# Patient Record
Sex: FEMALE | Race: WHITE | NOT HISPANIC OR LATINO | URBAN - METROPOLITAN AREA
[De-identification: names, ages, dates, MRNs, and addresses within clinical notes are randomized per-mention and may not be internally consistent; named-entity substitution may affect disease eponyms.]

---

## 2021-03-31 DIAGNOSIS — Z23 ENCOUNTER FOR IMMUNIZATION: ICD-10-CM

## 2021-04-12 ENCOUNTER — IMMUNIZATIONS (OUTPATIENT)
Dept: FAMILY MEDICINE CLINIC | Facility: HOSPITAL | Age: 66
End: 2021-04-12

## 2021-04-12 DIAGNOSIS — Z23 ENCOUNTER FOR IMMUNIZATION: Primary | ICD-10-CM

## 2021-04-12 PROCEDURE — 0001A SARS-COV-2 / COVID-19 MRNA VACCINE (PFIZER-BIONTECH) 30 MCG: CPT

## 2021-04-12 PROCEDURE — 91300 SARS-COV-2 / COVID-19 MRNA VACCINE (PFIZER-BIONTECH) 30 MCG: CPT

## 2021-05-05 ENCOUNTER — IMMUNIZATIONS (OUTPATIENT)
Dept: FAMILY MEDICINE CLINIC | Facility: HOSPITAL | Age: 66
End: 2021-05-05

## 2021-05-05 DIAGNOSIS — Z23 ENCOUNTER FOR IMMUNIZATION: Primary | ICD-10-CM

## 2021-05-05 PROCEDURE — 0002A SARS-COV-2 / COVID-19 MRNA VACCINE (PFIZER-BIONTECH) 30 MCG: CPT

## 2021-05-05 PROCEDURE — 91300 SARS-COV-2 / COVID-19 MRNA VACCINE (PFIZER-BIONTECH) 30 MCG: CPT

## 2022-11-30 ENCOUNTER — ANNUAL EXAM (OUTPATIENT)
Dept: OBGYN CLINIC | Facility: CLINIC | Age: 67
End: 2022-11-30

## 2022-11-30 VITALS
WEIGHT: 224 LBS | DIASTOLIC BLOOD PRESSURE: 80 MMHG | BODY MASS INDEX: 37.32 KG/M2 | SYSTOLIC BLOOD PRESSURE: 138 MMHG | HEIGHT: 65 IN

## 2022-11-30 DIAGNOSIS — Z12.31 ENCOUNTER FOR SCREENING MAMMOGRAM FOR MALIGNANT NEOPLASM OF BREAST: ICD-10-CM

## 2022-11-30 DIAGNOSIS — Z01.419 ENCNTR FOR GYN EXAM (GENERAL) (ROUTINE) W/O ABN FINDINGS: Primary | ICD-10-CM

## 2022-11-30 RX ORDER — LANSOPRAZOLE 30 MG/1
30 CAPSULE, DELAYED RELEASE ORAL DAILY
COMMUNITY
Start: 2022-11-23

## 2022-11-30 RX ORDER — ALBUTEROL SULFATE 90 UG/1
2 AEROSOL, METERED RESPIRATORY (INHALATION) EVERY 6 HOURS PRN
COMMUNITY

## 2022-11-30 RX ORDER — VITAMIN B COMPLEX
2000 TABLET ORAL
COMMUNITY
Start: 2020-11-01

## 2022-11-30 RX ORDER — METOPROLOL SUCCINATE 100 MG/1
100 TABLET, EXTENDED RELEASE ORAL DAILY
COMMUNITY
Start: 2022-11-17

## 2022-11-30 RX ORDER — MONTELUKAST SODIUM 10 MG/1
10 TABLET ORAL AS NEEDED
COMMUNITY

## 2022-11-30 NOTE — PROGRESS NOTES
Medicare [facility]  [address], [address], [place], [place]    ASSESSMENT/PLAN: Per Murcia is a 78 y o  No obstetric history on file  who presents for [facility] gynecologic wellness exam     Encounter for routine gynecologic examination  - Routine well woman exam completed today  - Cervical Cancer Screening complete, no further screening necessary   - Breast Cancer Screening: Last Mammogram 02/14/2022  - Colorectal cancer screening last 2 yrs ago- Osteoporosis screening 2 yrs ago, patient declines order for Dexa scan  - The following were reviewed in today's visit: breast self exam     Discussed with patient Medicare (and plans that act like Medicare) pay for wellness visit q 2 yrs - but patient may return for problems as needed  Recommend annual breast exam and mammogram   If not seen by our office on her off year, she can still call and ask for a screening mammogram order and the nurses will provide one for her  Additional problems addressed during this visit:  1  Encntr for gyn exam (general) (routine) w/o abn findings    2  Encounter for screening mammogram for malignant neoplasm of breast  -     Mammo screening bilateral w 3d & cad; Future        Next visit: 2 yrs/PRN    CC:  Medicare Gynecologic Wellness Examination    HPI: Per Murcia is a 78 y o  No obstetric history on file  who presents for [facility] gynecologic examination  Patient presents for Gyn exam   Denies having any concerns  Gyn History     She  reports that she is not currently sexually active and has had partner(s) who are male  She reports using the following method of birth control/protection: Post-menopausal        Past Medical History:  No date: Asthma  08/10/1984: Ectopic pregnancy  No date: Hiatal hernia  02/11/2022: History of screening mammography      Comment:  Bi-Rads 2   08/10/1984: History of transfusion  2021: Hypertension  2022: Hypertension  ? ??: Varicella     Past Surgical History:  1996: BREAST BIOPSY; N/A  No date: CONDYLOMA EXCISION/FULGURATION  1984: ECTOPIC PREGNANCY SURGERY; N/A  1983: TUBAL LIGATION; N/A     Family History   Problem Relation Age of Onset   • Thrombosis Mother    • Stroke Mother    • Hypertension Mother    • Heart attack Maternal Grandfather    • Cancer Paternal Grandmother    • Breast cancer Neg Hx    • Colon cancer Neg Hx         Social History     Tobacco Use   • Smoking status: Former     Packs/day: 0 00     Years: 40 00     Pack years: 0 00     Types: Cigarettes   • Smokeless tobacco: Never   Vaping Use   • Vaping Use: Never used   Substance Use Topics   • Alcohol use: Yes     Alcohol/week: 9 0 standard drinks     Types: 7 Glasses of wine, 2 Shots of liquor per week   • Drug use: Never          Current Outpatient Medications:   •  albuterol (PROVENTIL HFA,VENTOLIN HFA) 90 mcg/act inhaler, Inhale 2 puffs every 6 (six) hours as needed for wheezing, Disp: , Rfl:   •  cholecalciferol (VITAMIN D3) 25 mcg (1,000 units) tablet, 2,000 Units, Disp: , Rfl:   •  lansoprazole (PREVACID) 30 mg capsule, Take 30 mg by mouth daily, Disp: , Rfl:   •  metoprolol succinate (TOPROL-XL) 100 mg 24 hr tablet, Take 100 mg by mouth daily, Disp: , Rfl:   •  montelukast (SINGULAIR) 10 mg tablet, Take 10 mg by mouth if needed, Disp: , Rfl:     She is allergic to amoxicillin, bactrim [sulfamethoxazole-trimethoprim], bee venom, demerol [meperidine], and pollen extract       ROS negative except as noted in HPI    Objective:  /80   Ht 5' 4 75" (1 645 m)   Wt 102 kg (224 lb)   Breastfeeding No   BMI 37 56 kg/m²      Physical Exam  Vitals and nursing note reviewed  HENT:      Head: Normocephalic  Chest:   Breasts:     Breasts are symmetrical       Right: Normal  No bleeding, mass, nipple discharge, skin change or tenderness  Left: Normal  No bleeding, mass, nipple discharge, skin change or tenderness  Abdominal:      General: There is no distension  Palpations: Abdomen is soft  There is no mass  Tenderness: There is no abdominal tenderness  There is no rebound  Genitourinary:     General: Normal vulva  Exam position: Lithotomy position  Labia:         Right: No rash, tenderness or lesion  Left: No rash, tenderness or lesion  Urethra: No urethral pain or urethral lesion  Vagina: Normal  No vaginal discharge  Cervix: No discharge, friability, lesion or erythema  Uterus: Normal        Adnexa: Right adnexa normal and left adnexa normal       Rectum: No external hemorrhoid  Musculoskeletal:      Right lower leg: No edema  Left lower leg: No edema  Lymphadenopathy:      Upper Body:      Right upper body: No axillary or pectoral adenopathy  Left upper body: No axillary or pectoral adenopathy  Skin:     General: Skin is warm  Neurological:      Mental Status: She is alert and oriented to person, place, and time  Psychiatric:         Mood and Affect: Mood normal          Behavior: Behavior normal          Thought Content:  Thought content normal

## 2023-02-27 ENCOUNTER — TELEPHONE (OUTPATIENT)
Dept: OBGYN CLINIC | Facility: CLINIC | Age: 68
End: 2023-02-27

## 2023-02-27 NOTE — TELEPHONE ENCOUNTER
Patient l/m stating that she was in to see Dr Donna Gottron 11/30/22 and at that time patient had declined Dexa Scan  Now she changed her mind and wishes to proceed with the Dexa scan and requesting the order to be faxed to [facility]  Dr Donna Gottron please address

## 2023-03-02 NOTE — TELEPHONE ENCOUNTER
No diagnosis code in chart for clinical staff to provide order  Dr Akhil Degroot, please generate dexa scan order

## 2023-03-06 NOTE — TELEPHONE ENCOUNTER
3/6/2023 patient called wanted to know if Dexa Scan order was faxed to [facility], I informed patient that it was not faxed as yet, kindly give patient a call when order is faxed  Thank you in advance

## 2023-03-08 DIAGNOSIS — Z12.31 ENCOUNTER FOR SCREENING MAMMOGRAM FOR MALIGNANT NEOPLASM OF BREAST: ICD-10-CM

## 2023-03-08 DIAGNOSIS — Z78.0 POSTMENOPAUSAL ESTROGEN DEFICIENCY: Primary | ICD-10-CM

## 2023-03-08 DIAGNOSIS — R93.7 ABNORMAL BONE DENSITY SCREENING: ICD-10-CM

## 2023-03-09 NOTE — TELEPHONE ENCOUNTER
Order placed in chart, however unable to transmit to [address]  Please mail to patient or advise to pick it up from the office

## 2023-03-22 DIAGNOSIS — R93.7 ABNORMAL BONE DENSITY SCREENING: ICD-10-CM

## 2023-03-22 DIAGNOSIS — Z78.0 POSTMENOPAUSAL ESTROGEN DEFICIENCY: ICD-10-CM

## 2023-03-23 ENCOUNTER — TELEPHONE (OUTPATIENT)
Dept: OBGYN CLINIC | Facility: CLINIC | Age: 68
End: 2023-03-23

## 2024-05-02 ENCOUNTER — TELEPHONE (OUTPATIENT)
Age: 69
End: 2024-05-02

## 2024-05-02 DIAGNOSIS — Z12.31 ENCOUNTER FOR SCREENING MAMMOGRAM FOR MALIGNANT NEOPLASM OF BREAST: Primary | ICD-10-CM

## 2024-05-02 NOTE — TELEPHONE ENCOUNTER
Patient is requesting mammogram script to be placed evan her chart and faxed to [facility]    Fax to [phone].

## 2024-05-13 DIAGNOSIS — Z12.31 ENCOUNTER FOR SCREENING MAMMOGRAM FOR MALIGNANT NEOPLASM OF BREAST: ICD-10-CM

## 2024-12-04 ENCOUNTER — ANNUAL EXAM (OUTPATIENT)
Dept: OBGYN CLINIC | Facility: CLINIC | Age: 69
End: 2024-12-04
Payer: MEDICARE

## 2024-12-04 VITALS
SYSTOLIC BLOOD PRESSURE: 132 MMHG | BODY MASS INDEX: 35.82 KG/M2 | HEIGHT: 65 IN | WEIGHT: 215 LBS | DIASTOLIC BLOOD PRESSURE: 76 MMHG

## 2024-12-04 DIAGNOSIS — Z12.31 ENCOUNTER FOR SCREENING MAMMOGRAM FOR MALIGNANT NEOPLASM OF BREAST: ICD-10-CM

## 2024-12-04 DIAGNOSIS — Z01.419 ENCNTR FOR GYN EXAM (GENERAL) (ROUTINE) W/O ABN FINDINGS: Primary | ICD-10-CM

## 2024-12-04 PROCEDURE — G0101 CA SCREEN;PELVIC/BREAST EXAM: HCPCS | Performed by: OBSTETRICS & GYNECOLOGY

## 2024-12-04 RX ORDER — DOXYCYCLINE HYCLATE 50 MG/1
50 CAPSULE ORAL DAILY
COMMUNITY
Start: 2024-10-30

## 2024-12-04 RX ORDER — ANTIOX #8/OM3/DHA/EPA/LUT/ZEAX 250-2.5 MG
CAPSULE ORAL
COMMUNITY
Start: 2022-01-01

## 2024-12-04 RX ORDER — AMLODIPINE BESYLATE 5 MG/1
1 TABLET ORAL DAILY
COMMUNITY
Start: 2024-09-05

## 2024-12-05 NOTE — PROGRESS NOTES
Medicare Gyn Wellness  [facility] [place]  [address], [address], [place] [address]    ASSESSMENT/PLAN: Heavenly Gregorio is a 68 y.o. No obstetric history on file. who presents for Medicare gynecologic wellness exam.    Encounter for routine gynecologic examination  - Routine well woman exam completed today.  - Cervical Cancer Screening complete, no further screening necessary.  - Breast Cancer Screening: Last Mammogram 05/10/2024, order provided  - Colorectal cancer screening upto date with Cologuard.  - Osteoporosis screening Dexa scan 2023.  - The following were reviewed in today's visit: breast self exam, mammography screening ordered, menopause, osteoporosis, adequate intake of calcium and vitamin D, exercise, and healthy diet.    Assessment & Plan  Encounter for screening mammogram for malignant neoplasm of breast    Orders:    Mammo screening bilateral w 3d and cad; Future    Encntr for gyn exam (general) (routine) w/o abn findings           Discussed with patient Medicare (and plans that act like Medicare) pay for wellness visit q 2 yrs - but patient may return for problems as needed.  Recommend annual breast exam and mammogram.  If not seen by our office on her off year, she can still call and ask for a screening mammogram order and the nurses will provide one for her.    Next visit: 2 yrs [place]      CC:  Medicare Gynecologic Wellness Examination    HPI: Heavenly Gregorio is a 68 y.o. No obstetric history on file. who presents for Medicare gynecologic examination.      Gyn History       She  reports that she is not currently sexually active and has had partner(s) who are male. She reports using the following method of birth control/protection: Diaphragm.       Past Medical History:  No date: Asthma  08/10/1984: Ectopic pregnancy  No date: Hiatal hernia  02/11/2022: History of screening mammography      Comment:  Bi-Rads 2.  08/10/1984: History of transfusion  2021: Hypertension  2022:  "Hypertension  ???: Varicella     Past Surgical History:  1996: BREAST BIOPSY; N/A  No date: CONDYLOMA EXCISION/FULGURATION  1984: ECTOPIC PREGNANCY SURGERY; N/A  1983: TUBAL LIGATION; N/A     Family History   Problem Relation Age of Onset    Thrombosis Mother     Stroke Mother     Hypertension Mother     Heart attack Maternal Grandfather     Cancer Paternal Grandmother     Breast cancer Neg Hx     Colon cancer Neg Hx         Social History     Tobacco Use    Smoking status: Former     Current packs/day: 0.00     Types: Cigarettes    Smokeless tobacco: Never   Vaping Use    Vaping status: Never Used   Substance Use Topics    Alcohol use: Yes     Alcohol/week: 9.0 standard drinks of alcohol     Types: 7 Glasses of wine, 2 Shots of liquor per week    Drug use: Never          Current Outpatient Medications:     albuterol (PROVENTIL HFA,VENTOLIN HFA) 90 mcg/act inhaler, Inhale 2 puffs every 6 (six) hours as needed for wheezing, Disp: , Rfl:     amLODIPine (NORVASC) 5 mg tablet, Take 1 tablet by mouth in the morning, Disp: , Rfl:     cholecalciferol (VITAMIN D3) 25 mcg (1,000 units) tablet, 2,000 Units, Disp: , Rfl:     doxycycline hyclate (VIBRAMYCIN) 50 mg capsule, 50 mg daily, Disp: , Rfl:     lansoprazole (PREVACID) 30 mg capsule, Take 30 mg by mouth daily, Disp: , Rfl:     montelukast (SINGULAIR) 10 mg tablet, Take 10 mg by mouth if needed, Disp: , Rfl:     Multiple Vitamins-Minerals (PreserVision AREDS 2) CAPS, , Disp: , Rfl:     She is allergic to amoxicillin, bactrim [sulfamethoxazole-trimethoprim], bee venom, demerol [meperidine], and pollen extract..    ROS negative except as noted in HPI    Objective:  /76 (BP Location: Right arm, Patient Position: Sitting, Cuff Size: Large)   Ht 5' 4.75\" (1.645 m)   Wt 97.5 kg (215 lb)   BMI 36.05 kg/m²      Physical Exam  Vitals and nursing note reviewed.   HENT:      Head: Normocephalic.   Chest:   Breasts:     Breasts are symmetrical.      Right: Normal. No " bleeding, mass, nipple discharge, skin change or tenderness.      Left: Normal. No bleeding, mass, nipple discharge, skin change or tenderness.   Abdominal:      General: There is no distension.      Palpations: Abdomen is soft. There is no mass.      Tenderness: There is no abdominal tenderness. There is no rebound.   Genitourinary:     General: Normal vulva.      Exam position: Lithotomy position.      Labia:         Right: No rash, tenderness or lesion.         Left: No rash, tenderness or lesion.       Urethra: No urethral pain or urethral lesion.      Vagina: Normal. No vaginal discharge.      Cervix: No discharge, friability, lesion or erythema.      Uterus: Normal.       Adnexa: Right adnexa normal and left adnexa normal.      Rectum: No external hemorrhoid.   Musculoskeletal:      Right lower leg: No edema.      Left lower leg: No edema.   Lymphadenopathy:      Upper Body:      Right upper body: No axillary or pectoral adenopathy.      Left upper body: No axillary or pectoral adenopathy.   Skin:     General: Skin is warm.   Neurological:      Mental Status: She is alert and oriented to person, place, and time.   Psychiatric:         Mood and Affect: Mood normal.         Behavior: Behavior normal.         Thought Content: Thought content normal.

## 2025-06-17 DIAGNOSIS — Z12.31 ENCOUNTER FOR SCREENING MAMMOGRAM FOR MALIGNANT NEOPLASM OF BREAST: ICD-10-CM
